# Patient Record
(demographics unavailable — no encounter records)

---

## 2024-10-30 NOTE — PHYSICAL EXAM
[de-identified] : Gen: NAD Head: NC/AT Eyes: no glasses, no scleral icterus ENT: mucous membranes moist CV: RRR, S1 S2, no mrg Lungs: CTAB, nonlabored breathing Abd: soft, NT/ND Ext: full ROM in all extremities, no peripheral edema Back: +SLR on the left, limited extension and flexion 2/2 pain Neuro: CN intact; decreased sensation over LEFT S1 LEs +5 L +5 R hip flexion +5 L +5 R leg extension +5 L +5 R leg flexion +5 L +5 R foot dorsiflexion +5 L +5 R foot plantarflexion +5 L +5 R EHL extension Psych: normal affect Skin: no visible lesions

## 2024-10-30 NOTE — ASSESSMENT
[FreeTextEntry1] : Patient is presenting with acute/sub-acute radicular pain with impairment in ADLs and functionality.  The pain has not responded sufficiently to  conservative care including nsaid therapy and/or physical therapy.  There is no bleeding tendency, unstable medical condition, or systemic infection. The purpose of the spinal injections is to facilitate active therapy by providing short term relief through reduction of pain and inflammation.     Injections, by themselves, are not likely to provide long-term relief. Rather, active rehabilitation with modified work achieves long-term relief by increasing active ROM, strength and stability. After discussing various treatment options with the patient including but not limited to oral medications, physical therapy, exercise, modalities as well as interventional spinal injections, we have decided with the following plan:    1) Intervention Injection Therapy:  I personally reviewed the MRI/CT scan images and agree with the radiologist's report. The radiological findings were discussed with the patient.  The risks, benefits, contents and alternatives to injection were explained in full to the patient. Risks outlined include but are not limited to infection,sepsis, bleeding, post-dural puncture headache, nerve damage, temporary increase in pain, syncopal episode, failure to resolve symptoms, allergic reaction, symptom recurrence, and elevation of blood sugar in diabetics. Cortisone may cause immunosuppression. Patient understands the risks. All questions were answered. After discussion of options, patient requested an injection. Information regarding the injection was given to the patient. Which medications to stop prior to the injection was explained to the patient as well.    Follow up in 1-2 weeks post injection for re-evaluation.   Continue Home exercises, stretching, activity modification, physical therapy, and conservative care. Left L5-S1, S1 TFESI

## 2024-10-30 NOTE — DISCUSSION/SUMMARY
[de-identified] : WC - DOI 5/26/2021  JOSE LUIS RAMÍREZ is a 60 year-old man presenting for a RPV for a history of chronic low back pain with radicular features.   Prior treatment: 7/26/2024 LEFT L5-S1 and S1 TFESI w/o MAC w/ 95% relief of pain and improved function, including standing and walking  3/1/2024: LEFT L5-S1 and S1 TFESI w/ 70% relief of pain and improved function, including standing and walking Physical therapy Methocarbamol  Plan: 1) MRI lumbar spine images reviewed with the patient. 2) Schedule LEFT L5-S1 and S1 TFESI w/o MAC. The procedure was explained to the patient in detail. Reviewed risks, benefits, and alternatives with the patient. Some risks discussed included temporary increase in pain, bleeding, infection, and side effects from steroids. The patient expressed understanding and would like to proceed. 3) STOP gabapentin 300mg, patient notes insomnia and pruritis 4) Continue methocarbamol 750mg prn; denies AEs 5) Continue home exercise regime 6) RTC post procedure  Patient has returned to full duty work. Temporary partial impairment due to pain.

## 2024-10-30 NOTE — DISCUSSION/SUMMARY
[de-identified] : WC - DOI 5/26/2021  JOSE LUIS RAMÍREZ is a 60 year-old man presenting for a RPV for a history of chronic low back pain with radicular features.   Prior treatment: 7/26/2024 LEFT L5-S1 and S1 TFESI w/o MAC w/ 95% relief of pain and improved function, including standing and walking  3/1/2024: LEFT L5-S1 and S1 TFESI w/ 70% relief of pain and improved function, including standing and walking Physical therapy Methocarbamol  Plan: 1) MRI lumbar spine images reviewed with the patient. 2) Schedule LEFT L5-S1 and S1 TFESI w/o MAC. The procedure was explained to the patient in detail. Reviewed risks, benefits, and alternatives with the patient. Some risks discussed included temporary increase in pain, bleeding, infection, and side effects from steroids. The patient expressed understanding and would like to proceed. 3) STOP gabapentin 300mg, patient notes insomnia and pruritis 4) Continue methocarbamol 750mg prn; denies AEs 5) Continue home exercise regime 6) RTC post procedure  Patient has returned to full duty work. Temporary partial impairment due to pain.

## 2024-10-30 NOTE — HISTORY OF PRESENT ILLNESS
[Lower back] : lower back [Buttock] : buttock [Left Leg] : left leg [Work related] : work related [1] : 2 [Burning] : burning [Dull/Aching] : dull/aching [Radiating] : radiating [Throbbing] : throbbing [Intermittent] : intermittent [Household chores] : household chores [Work] : work [Sleep] : sleep [Rest] : rest [Meds] : meds [Ice] : ice [Heat] : heat [Injection therapy] : injection therapy [Nothing helps with pain getting better] : Nothing helps with pain getting better [Sitting] : sitting [Walking] : walking [7] : 7 [5] : 5 [FreeTextEntry1] : WC - DOI 5/26/2021  10/30/2024: JOSE LUIS RAMÍREZ is a 60 year-old man presenting for a RPV for a history of chronic low back pain. The patient notes some recent recurrence of pain in the left low back with radiation to the LLE. Notes that he is still feels overall relief from his LEFT L5-S1, S1 TFESI on 7/26/2024 but feels that this has waned over the past 1-2 weeks. The patient states that average pain over the past week was 6/10 in severity. Mood: Patient denies depression or anxiety at this time.  Sleep: Notes ongoing difficulty with sleep 2/2 pain.    8/9/2024 JOSE LUIS RAMÍREZ is a 59 year-old man presenting for a RPV for a history of chronic low back pain with radicular features. 07/26/2024 LEFT L5-S1 and S1 TFESI w/o MAC with 95% relief The patient states that average pain over the past week was 1/10 in severity.  5/24/2024 JOSE LUIS RAMÍREZ is a 59 year-old man presenting for a RPV for a history of chronic low back pain with radicular features.  Had a broken first toe 5 weeks ago.  Now in a boot.  Will schedule repeat injection.  Patient with at least 50% overall improvement following epidural. Improvements in ROM, strength and pain. This leads to an overall improvement in patients quality of life. I would recommend repeat BHUPINDER for cumulative improvement.  3/22/2024:  JOSE LUIS RAMÍREZ is a 59 year-old man presenting for a RPV for a history of chronic low back pain with radicular features. The patient underwent a LEFT L5-S1 and S1 TFESI on 3/1/2024. The patient notes having 70% relief of pain since the procedure and had significant improvement in function. 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 He also notes improvement in the LLE radicular features. He notes ongoing tingling and numbness over the plantar aspect of the left foot. No focal weakness, bowel or bladder incontinence or saddle anesthesia.  The patient states that average pain over the past week was 7/10 in severity. Mood: Patient has stable depression and anxiety.  Sleep: Patient notes having some disruption to sleep every night due to pain.   1/12/2024: JOSE LUIS RAMÍREZ is a 59 year-old man presenting for a NPV for a history of chronic low back pain with radicular features. The patient was rolling heavy drums at work and was pulling in the opposite direction as his colleague on 5/26/2021. He injured his neck, low back, and left shoulder. In regards to the low back, the patient notes having an aching pain in the left low back with radiation to the left posterior thigh and leg and into the plantar left foot. He notes intermittent burning and numbness over this distribution. No focal weakness. No bowel or bladder incontinence or saddle anesthesia.  The patient states that average pain over the past week was 9/10 in severity. Mood: Patient notes both depression and anxiety. He is not being treated for this.  Sleep: Patient notes significant nightly difficulty with sleep initiation and maintenance 2/2 pain.   [] : Patient is currently injured and not playing sports: no [FreeTextEntry3] : 05/26/2021 [FreeTextEntry6] : numbness  [FreeTextEntry7] : left foot  [de-identified] : lifting and bending  [de-identified] : MRI and x-ray

## 2024-10-30 NOTE — DATA REVIEWED
[MRI] : MRI [Lumbar Spine] : lumbar spine [Report was reviewed and noted in the chart] : The report was reviewed and noted in the chart [I independently reviewed and interpreted images and report] : I independently reviewed and interpreted images and report [FreeTextEntry1] : 11/22/2023 (ZP) Findings:  Priors: None  There is mild lumbar dextroscoliosis. There is slight grade 1 retrolisthesis of L2 on L3. The vertebrae heights are maintained. There are Modic type I endplate changes at L2-L3. There are Modic type II endplate changes at L5-S1. There is loss of intervertebral disc height at L1-L2, L2-L3 and L5-S1. The visualized spinal cord demonstrates normal signal intensity and caliber. The conus medullaris terminates at the expected level.  At L1-2: Disc bulge with a central zone posterior annular fissure mildly narrowing the spinal canal. There is no neural foraminal narrowing.  At L2-3: Disc bulge with a superimposed left foraminal disc herniation resulting in mild spinal canal stenosis and mild left-sided neural foraminal narrowing. The right neural foramen is patent.  At L3-4: Disc bulge effacing the ventral thecal sac and mildly narrowing the left neural foramen. The right neural foramen is patent.  At L4-5: Disc bulge effacing the ventral thecal sac and mildly narrowing the neural foramen.  At L5-S1: Circumferential disc bulge with a superimposed left lateralizing protruding type disc herniation impinging the descending left S1 nerve root within the subareolar recess and resulting in moderate to severe bilateral neural foraminal narrowing with impingement the exiting L5 nerve roots.  IMPRESSION:  Mild lumbar dextroscoliosis.  Modic type I endplate changes at L2-L3 and Modic type II endplate changes at L5-S1.  At L1-2: Disc bulge with a central zone posterior annular fissure mildly narrowing the spinal canal. Th  At L2-3: Disc bulge with a superimposed left foraminal disc herniation resulting in mild spinal canal stenosis and mild left-sided neural foraminal narrowing.  At L3-4: Disc bulge effacing the ventral thecal sac and mildly narrowing the left neural foramen.  At L4-5: Disc bulge effacing the ventral thecal sac and mildly narrowing the neural foramen.  At L5-S1: Circumferential disc bulge with a superimposed left lateralizing protruding type disc herniation impinging the descending left S1 nerve root within the subareolar recess and resulting in moderate to severe bilateral neural foraminal narrowing with impingement the exiting L5 nerve roots.

## 2024-10-30 NOTE — HISTORY OF PRESENT ILLNESS
[Lower back] : lower back [Buttock] : buttock [Left Leg] : left leg [Work related] : work related [1] : 2 [Burning] : burning [Dull/Aching] : dull/aching [Radiating] : radiating [Throbbing] : throbbing [Intermittent] : intermittent [Household chores] : household chores [Work] : work [Sleep] : sleep [Rest] : rest [Meds] : meds [Ice] : ice [Heat] : heat [Injection therapy] : injection therapy [Nothing helps with pain getting better] : Nothing helps with pain getting better [Sitting] : sitting [Walking] : walking [7] : 7 [5] : 5 [FreeTextEntry1] : WC - DOI 5/26/2021  10/30/2024: JOSE LUIS RAMÍREZ is a 60 year-old man presenting for a RPV for a history of chronic low back pain. The patient notes some recent recurrence of pain in the left low back with radiation to the LLE. Notes that he is still feels overall relief from his LEFT L5-S1, S1 TFESI on 7/26/2024 but feels that this has waned over the past 1-2 weeks. The patient states that average pain over the past week was 6/10 in severity. Mood: Patient denies depression or anxiety at this time.  Sleep: Notes ongoing difficulty with sleep 2/2 pain.    8/9/2024 JOSE LUIS RAMÍREZ is a 59 year-old man presenting for a RPV for a history of chronic low back pain with radicular features. 07/26/2024 LEFT L5-S1 and S1 TFESI w/o MAC with 95% relief The patient states that average pain over the past week was 1/10 in severity.  5/24/2024 JOSE LUIS RAMÍREZ is a 59 year-old man presenting for a RPV for a history of chronic low back pain with radicular features.  Had a broken first toe 5 weeks ago.  Now in a boot.  Will schedule repeat injection.  Patient with at least 50% overall improvement following epidural. Improvements in ROM, strength and pain. This leads to an overall improvement in patients quality of life. I would recommend repeat BHUPINDER for cumulative improvement.  3/22/2024:  JOSE LUIS RAMÍREZ is a 59 year-old man presenting for a RPV for a history of chronic low back pain with radicular features. The patient underwent a LEFT L5-S1 and S1 TFESI on 3/1/2024. The patient notes having 70% relief of pain since the procedure and had significant improvement in function. 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 He also notes improvement in the LLE radicular features. He notes ongoing tingling and numbness over the plantar aspect of the left foot. No focal weakness, bowel or bladder incontinence or saddle anesthesia.  The patient states that average pain over the past week was 7/10 in severity. Mood: Patient has stable depression and anxiety.  Sleep: Patient notes having some disruption to sleep every night due to pain.   1/12/2024: JOSE LUIS RAMÍREZ is a 59 year-old man presenting for a NPV for a history of chronic low back pain with radicular features. The patient was rolling heavy drums at work and was pulling in the opposite direction as his colleague on 5/26/2021. He injured his neck, low back, and left shoulder. In regards to the low back, the patient notes having an aching pain in the left low back with radiation to the left posterior thigh and leg and into the plantar left foot. He notes intermittent burning and numbness over this distribution. No focal weakness. No bowel or bladder incontinence or saddle anesthesia.  The patient states that average pain over the past week was 9/10 in severity. Mood: Patient notes both depression and anxiety. He is not being treated for this.  Sleep: Patient notes significant nightly difficulty with sleep initiation and maintenance 2/2 pain.   [] : Patient is currently injured and not playing sports: no [FreeTextEntry3] : 05/26/2021 [FreeTextEntry6] : numbness  [FreeTextEntry7] : left foot  [de-identified] : lifting and bending  [de-identified] : MRI and x-ray

## 2024-10-30 NOTE — PHYSICAL EXAM
[de-identified] : Gen: NAD Head: NC/AT Eyes: no glasses, no scleral icterus ENT: mucous membranes moist CV: RRR, S1 S2, no mrg Lungs: CTAB, nonlabored breathing Abd: soft, NT/ND Ext: full ROM in all extremities, no peripheral edema Back: +SLR on the left, limited extension and flexion 2/2 pain Neuro: CN intact; decreased sensation over LEFT S1 LEs +5 L +5 R hip flexion +5 L +5 R leg extension +5 L +5 R leg flexion +5 L +5 R foot dorsiflexion +5 L +5 R foot plantarflexion +5 L +5 R EHL extension Psych: normal affect Skin: no visible lesions

## 2024-12-20 NOTE — PROCEDURE
[FreeTextEntry1] : LEFT L5-S1, S1 transforaminal epidural steroid injection [FreeTextEntry2] : chronic lumbar radiculopathy [FreeTextEntry3] : Procedure Date: 12/20/2024   Preoperative Diagnosis: chronic lumbar radiculopathy   Procedure: LEFT L5-S1, S1 transforaminal epidural steroid injection under fluoroscopic guidance  Anesthesia: local  Complications: none   EBL: none   Procedure in detail:   Patient was seen and examined. Risks, benefits, and alternatives for the procedure were discussed with the patient in detail. The patient expressed understanding, gave written and verbal consent, and placed themselves in a prone position on the procedure table. Skin overlying the lumbosacral spine was prepped with chloraprep and draped in the usual sterile fashion. Fluoroscopic images were obtained to identify the L5 and S1 vertebral bodies. Target was the 6 o'clock position under the LEFT pedicle at the L5 and S1 vertebral level. Skin overlying the target was marked and infiltrated with 1% lidocaine. Using two 22 gauge, 3.5 inch spinal needles, these were inserted and advanced under intermittent fluoroscopic guidance. When felt to be engaged in the epidural space, lateral view was used to confirm depth. After negative aspiration for heme/csf, contrast was injected under live fluoroscopy, which showed contrast spread consistent with epidural flow. No evidence of intravascular or intrathecal uptake. At this point, a total of 2ml of injectate, which consisted of 1ml of 6mg/ml betamethasone and 1ml of normal saline were injected through each needle. The needles were restyletted and withdrawn, a band aid was placed over the injection site. Patient tolerated the procedure well. The patient recovered uneventfully and was discharged home in stable condition.